# Patient Record
Sex: FEMALE | Race: BLACK OR AFRICAN AMERICAN | NOT HISPANIC OR LATINO | Employment: UNEMPLOYED | URBAN - METROPOLITAN AREA
[De-identification: names, ages, dates, MRNs, and addresses within clinical notes are randomized per-mention and may not be internally consistent; named-entity substitution may affect disease eponyms.]

---

## 2023-06-28 ENCOUNTER — OFFICE VISIT (OUTPATIENT)
Dept: PRIMARY CARE | Facility: CLINIC | Age: 58
End: 2023-06-28
Payer: COMMERCIAL

## 2023-06-28 DIAGNOSIS — R47.81 DEFICIT IN COMMUNICATION DUE TO SLURRED SPEECH: Primary | ICD-10-CM

## 2023-06-28 PROCEDURE — 99213 OFFICE O/P EST LOW 20 MIN: CPT | Performed by: INTERNAL MEDICINE

## 2023-06-28 ASSESSMENT — ENCOUNTER SYMPTOMS
COUGH: 0
SHORTNESS OF BREATH: 0
FEVER: 0
PALPITATIONS: 0
POLYDIPSIA: 0
CHILLS: 0

## 2023-06-28 NOTE — PROGRESS NOTES
Subjective   Patient ID: Mariluz Beck is a 58 y.o. female who presents for No chief complaint on file..    Patient presents today for chronic concerns of slurring of speech.  There is been no history of stroke seizure or neurologic disorder of any type.  This has been gradual onset.  The patient's not particularly verbose and there is been some weakening of the motor muscles she slurs a little bit with her speech it can make her hard to understand that she does not talk with great vocal clarity now.  They are requesting for an evaluation of speech therapy to improve upon her vocal clarity and speech clarity.  Otherwise she has no focal deficits no history of neurological deficits and no history of stroke trauma and no other complaints at this time.  No headaches no nothing.             Review of Systems   Constitutional:  Negative for chills and fever.   Respiratory:  Negative for cough and shortness of breath.    Cardiovascular:  Negative for chest pain and palpitations.   Endocrine: Negative for polydipsia and polyuria.       Objective   There were no vitals taken for this visit.    Physical Exam  HENT:      Head: Normocephalic and atraumatic.      Right Ear: Tympanic membrane and ear canal normal. There is no impacted cerumen.      Left Ear: Tympanic membrane and ear canal normal. There is no impacted cerumen.      Nose: Nose normal.      Mouth/Throat:      Mouth: Mucous membranes are moist.      Pharynx: Oropharynx is clear.   Eyes:      Extraocular Movements: Extraocular movements intact.      Pupils: Pupils are equal, round, and reactive to light.   Cardiovascular:      Rate and Rhythm: Normal rate.   Pulmonary:      Effort: No respiratory distress.      Breath sounds: No wheezing, rhonchi or rales.   Musculoskeletal:      Cervical back: Neck supple. No tenderness.   Lymphadenopathy:      Cervical: No cervical adenopathy.   Neurological:      General: No focal deficit present.      Cranial Nerves: Cranial  nerves 2-12 are intact.         Assessment/Plan

## 2023-07-21 ENCOUNTER — TELEPHONE (OUTPATIENT)
Dept: PRIMARY CARE | Facility: CLINIC | Age: 58
End: 2023-07-21
Payer: COMMERCIAL

## 2023-09-11 ENCOUNTER — PATIENT OUTREACH (OUTPATIENT)
Dept: PRIMARY CARE | Facility: CLINIC | Age: 58
End: 2023-09-11
Payer: COMMERCIAL

## 2023-09-11 ENCOUNTER — TELEPHONE (OUTPATIENT)
Dept: PRIMARY CARE | Facility: CLINIC | Age: 58
End: 2023-09-11
Payer: COMMERCIAL

## 2023-09-11 RX ORDER — LEVETIRACETAM 500 MG/1
1000 TABLET ORAL 2 TIMES DAILY
COMMUNITY

## 2023-09-11 RX ORDER — LEVOTHYROXINE SODIUM 100 UG/1
100 TABLET ORAL
COMMUNITY

## 2023-09-11 NOTE — PROGRESS NOTES
Discharge Facility: [facility]   Discharge Diagnosis: Nausea AND vomiting  Admission Date: 9-8-2023  Discharge Date: 9-    PCP Appointment Date: No available TCM appointments.  will outreach the clinical pool /office for scheduling assistance    Specialist Appointment Date: Dr De La Rosa  [facility] Encounter and Summary: Linked   See discharge assessment below for further details    Engagement  Call Start Time: 1536 (9/11/2023  3:36 PM)    Medications  Medications reviewed with patient/caregiver?: Yes (patients mother) (9/11/2023  3:36 PM)  Is the patient having any side effects they believe may be caused by any medication additions or changes?: No (9/11/2023  3:36 PM)  Does the patient have all medications ordered at discharge?: Yes (9/11/2023  3:36 PM)  Prescription Comments: Denies any  questions or concerns about their medications once they are home. Verbalizes an understanding regarding how to take their medications and which medications they should be taking. (9/11/2023  3:36 PM)  Is the patient taking all medications as directed (includes completed medication regime)?: Yes (9/11/2023  3:36 PM)  Care Management Interventions: Provided patient education (9/11/2023  3:36 PM)  Medication Comments: gabapentin (NEURONTIN) 300 mg capsule  Take 900 mg by mouth twice daily.       aspirin, enteric coated (ASPIRIN, ENTERIC COATED) 81 mg EC tablet  Take 81 mg by mouth once daily                                                                                                          ferrous sulfate 325 mg (65 mg iron) tablet  Take 325 mg by mouth once daily.  SYNTHROID 100 mcg tablet  1 tab once daily LORIN SYNTHROID                cholecalciferol, vitamin D3, (VITAMIN D3 ORAL)  Take 1,000 Units by mouth once daily.  levETIRAcetam (KEPPRA) 500 mg tablet  Take 1,000 mg by mouth twice daily.     VIACTIV 500 MG-100 UNIT-40 MCG CHEWABLE TAB Take one(1) tablet two(2) times daily. (9/11/2023  3:36 PM)    Appointments  Does  the patient have a primary care provider?: Yes (2023  3:36 PM)  Care Management Interventions: -- (No available TCM appointments.  will outreach the clinical pool /office for scheduling assistance) (2023  3:36 PM)  Has the patient kept scheduled appointments due by today?: Yes (2023  3:36 PM)  Care Management Interventions: Advised to schedule with specialist (recommendations to follow up with Neurology) (2023  3:36 PM)    Self Management  What is the home health agency?: NA (2023  3:36 PM)  Has home health visited the patient within 72 hours of discharge?: Not applicable (2023  3:36 PM)  What Durable Medical Equipment (DME) was ordered?: NA (2023  3:36 PM)    Patient Teaching  Does the patient have access to their discharge instructions?: Yes (Reviewed with mom with patients permission) (2023  3:36 PM)  What is the patient's perception of their health status since discharge?: Improving (2023  3:36 PM)  Is the patient/caregiver able to teach back the hierarchy of who to call/visit for symptoms/problems? PCP, Specialist, Home Health nurse, Urgent Care, ED, 911: Yes (2023  3:36 PM)    Wrap Up  Wrap Up Additional Comments: Spoke w/ pt  Pt identified by name and  patient granted permission to speak with her mother. Denies any further nausea or vomiting. Feeling better.  Reviewed discharge instructions, medications, and follow up.  Patient denies any further discharge questions/needs at this time.  Mom has a call into Dr. Huizar to discuss recent hospitalization. Confirms they will attend the scheduled follow up appointment  if recommended to schedule. Encouraged to fu with Neurology per the discharge instructions. (2023  3:36 PM)  Call End Time: 1556 (2023  3:36 PM)

## 2023-09-19 ENCOUNTER — OFFICE VISIT (OUTPATIENT)
Dept: PRIMARY CARE | Facility: CLINIC | Age: 58
End: 2023-09-19
Payer: COMMERCIAL

## 2023-09-19 VITALS
BODY MASS INDEX: 30.94 KG/M2 | DIASTOLIC BLOOD PRESSURE: 60 MMHG | TEMPERATURE: 97.4 F | WEIGHT: 110 LBS | HEART RATE: 57 BPM | SYSTOLIC BLOOD PRESSURE: 114 MMHG

## 2023-09-19 DIAGNOSIS — R11.10 RECURRENT VOMITING: Primary | ICD-10-CM

## 2023-09-19 DIAGNOSIS — R11.10 REGURGITATION OF FOOD: ICD-10-CM

## 2023-09-19 DIAGNOSIS — R13.12 OROPHARYNGEAL DYSPHAGIA: ICD-10-CM

## 2023-09-19 PROBLEM — G40.309: Status: ACTIVE | Noted: 2023-09-19

## 2023-09-19 PROBLEM — G40.909 SEIZURE DISORDER (MULTI): Status: ACTIVE | Noted: 2023-09-19

## 2023-09-19 PROBLEM — M85.80 OSTEOPENIA: Status: ACTIVE | Noted: 2023-09-19

## 2023-09-19 PROBLEM — R62.50 DEVELOPMENTAL DELAY: Status: RESOLVED | Noted: 2018-07-12 | Resolved: 2023-09-19

## 2023-09-19 PROCEDURE — 1036F TOBACCO NON-USER: CPT | Performed by: INTERNAL MEDICINE

## 2023-09-19 PROCEDURE — 99214 OFFICE O/P EST MOD 30 MIN: CPT | Performed by: INTERNAL MEDICINE

## 2023-09-19 RX ORDER — CARBAMAZEPINE 100 MG/1
TABLET, CHEWABLE ORAL
COMMUNITY
Start: 2014-01-30

## 2023-09-19 RX ORDER — PANTOPRAZOLE SODIUM 40 MG/1
40 TABLET, DELAYED RELEASE ORAL
Qty: 45 TABLET | Refills: 0 | Status: SHIPPED | OUTPATIENT
Start: 2023-09-19 | End: 2023-09-19 | Stop reason: SDUPTHER

## 2023-09-19 RX ORDER — PANTOPRAZOLE SODIUM 40 MG/1
40 TABLET, DELAYED RELEASE ORAL
Qty: 45 TABLET | Refills: 0 | Status: SHIPPED | OUTPATIENT
Start: 2023-09-19 | End: 2024-05-07 | Stop reason: ALTCHOICE

## 2023-09-19 ASSESSMENT — ENCOUNTER SYMPTOMS
SHORTNESS OF BREATH: 0
CHILLS: 0
COUGH: 0
POLYDIPSIA: 0
FEVER: 0
PALPITATIONS: 0

## 2023-09-19 NOTE — PROGRESS NOTES
Subjective   Patient ID: Mariluz Beck is a 58 y.o. female who presents for Hospital Follow-up.    Engagement  Call Start Time: 1536 (9/11/2023  3:36 PM)    Medications  Medications reviewed with patient/caregiver?: Yes (patients mother) (9/11/2023  3:36 PM)  Is the patient having any side effects they believe may be caused by any medication additions or changes?: No (9/11/2023  3:36 PM)  Does the patient have all medications ordered at discharge?: Yes (9/11/2023  3:36 PM)  Prescription Comments: Denies any  questions or concerns about their medications once they are home. Verbalizes an understanding regarding how to take their medications and which medications they should be taking. (9/11/2023  3:36 PM)  Is the patient taking all medications as directed (includes completed medication regime)?: Yes (9/11/2023  3:36 PM)  Care Management Interventions: Provided patient education (9/11/2023  3:36 PM)  Medication Comments: gabapentin (NEURONTIN) 300 mg capsule  Take 900 mg by mouth twice daily.       aspirin, enteric coated (ASPIRIN, ENTERIC COATED) 81 mg EC tablet  Take 81 mg by mouth once daily                                                                                                          ferrous sulfate 325 mg (65 mg iron) tablet  Take 325 mg by mouth once daily.  SYNTHROID 100 mcg tablet  1 tab once daily LORIN SYNTHROID                cholecalciferol, vitamin D3, (VITAMIN D3 ORAL)  Take 1,000 Units by mouth once daily.  levETIRAcetam (KEPPRA) 500 mg tablet  Take 1,000 mg by mouth twice daily.     VIACTIV 500 MG-100 UNIT-40 MCG CHEWABLE TAB Take one(1) tablet two(2) times daily. (9/11/2023  3:36 PM)    Appointments  Does the patient have a primary care provider?: Yes (9/11/2023  3:36 PM)  Care Management Interventions: -- (No available TCM appointments.  will outreach the clinical pool /office for scheduling assistance) (9/11/2023  3:36 PM)  Has the patient kept scheduled appointments due by today?: Yes  (2023  3:36 PM)  Care Management Interventions: Advised to schedule with specialist (recommendations to follow up with Neurology) (2023  3:36 PM)    Self Management  What is the home health agency?: NA (2023  3:36 PM)  Has home health visited the patient within 72 hours of discharge?: Not applicable (2023  3:36 PM)  What Durable Medical Equipment (DME) was ordered?: NA (2023  3:36 PM)    Patient Teaching  Does the patient have access to their discharge instructions?: Yes (Reviewed with mom with patients permission) (2023  3:36 PM)  What is the patient's perception of their health status since discharge?: Improving (2023  3:36 PM)  Is the patient/caregiver able to teach back the hierarchy of who to call/visit for symptoms/problems? PCP, Specialist, Home Health nurse, Urgent Care, ED, 911: Yes (2023  3:36 PM)    Wrap Up  Wrap Up Additional Comments: Spoke w/ pt  Pt identified by name and  patient granted permission to speak with her mother. Denies any further nausea or vomiting. Feeling better.  Reviewed discharge instructions, medications, and follow up.  Patient denies any further discharge questions/needs at this time.  Mom has a call into Dr. Huizar to discuss recent hospitalization. Confirms they will attend the scheduled follow up appointment  if recommended to schedule. Encouraged to fu with Neurology per the discharge instructions. (2023  3:36 PM)  Call End Time: 1556 (2023  3:36 PM)    58-year-old female presents today posthospital discharge on September 10 after spontaneous recurring bouts of vomiting without warning after meals or eating something.  She was sent there by her neurologist who manages her for epilepsy.  She reports no episodes of abdominal pain related to this.  She reports no episodes of nausea preceding the vomiting.  There was spontaneous unpredictable and with very little warning for history.  There have been no episodes within the  last 4 days.  She was not started on any new medications and she denies any new symptoms at this time that is developed post hospital.  At this time today she complains of no symptoms and has been tolerant of p.o.         Review of Systems   Constitutional:  Negative for chills and fever.   Respiratory:  Negative for cough and shortness of breath.    Cardiovascular:  Negative for chest pain and palpitations.   Endocrine: Negative for polydipsia and polyuria.       Objective   /60 (BP Location: Right arm, Patient Position: Sitting, BP Cuff Size: Adult)   Pulse 57   Temp 36.3 °C (97.4 °F) (Temporal)   Wt 49.9 kg (110 lb)   BMI 30.94 kg/m²     Physical Exam  Constitutional:       Appearance: Normal appearance.   HENT:      Head: Normocephalic and atraumatic.   Eyes:      Extraocular Movements: Extraocular movements intact.      Pupils: Pupils are equal, round, and reactive to light.   Neck:      Thyroid: No thyroid mass or thyromegaly.      Vascular: No carotid bruit.   Cardiovascular:      Rate and Rhythm: Normal rate and regular rhythm.      Heart sounds: No murmur heard.     No friction rub. No gallop.   Pulmonary:      Effort: No respiratory distress.      Breath sounds: No wheezing, rhonchi or rales.   Musculoskeletal:      Cervical back: Neck supple.      Right lower leg: No edema.      Left lower leg: No edema.   Lymphadenopathy:      Cervical: No cervical adenopathy.   Neurological:      Mental Status: She is alert.         Assessment/Plan   Problem List Items Addressed This Visit    None  Visit Diagnoses       Recurrent vomiting    -  Primary    Relevant Medications    pantoprazole (ProtoNix) 40 mg EC tablet    Other Relevant Orders    FL esophagus barium swallow w video and speech    Regurgitation of food        Relevant Medications    pantoprazole (ProtoNix) 40 mg EC tablet    Other Relevant Orders    FL esophagus barium swallow w video and speech    Oropharyngeal dysphagia        Relevant Orders     FL esophagus barium swallow w video and speech

## 2023-09-28 ENCOUNTER — APPOINTMENT (OUTPATIENT)
Dept: PRIMARY CARE | Facility: CLINIC | Age: 58
End: 2023-09-28
Payer: COMMERCIAL

## 2023-09-28 ENCOUNTER — PATIENT OUTREACH (OUTPATIENT)
Dept: PRIMARY CARE | Facility: CLINIC | Age: 58
End: 2023-09-28

## 2023-09-28 NOTE — PROGRESS NOTES
Call regarding appt. with PCP on  9- after hospitalization.  At time of outreach call the patient feels as if their condition has improved.  They  deny any questions or concerns regarding  the recovery period  or follow up appointment,  Denies any needs at this time.

## 2023-10-03 ENCOUNTER — CLINICAL SUPPORT (OUTPATIENT)
Dept: PRIMARY CARE | Facility: CLINIC | Age: 58
End: 2023-10-03
Payer: MEDICARE

## 2023-10-03 DIAGNOSIS — Z23 ENCOUNTER FOR IMMUNIZATION: ICD-10-CM

## 2023-10-03 PROCEDURE — G0008 ADMIN INFLUENZA VIRUS VAC: HCPCS | Performed by: INTERNAL MEDICINE

## 2023-10-03 PROCEDURE — 90682 RIV4 VACC RECOMBINANT DNA IM: CPT | Performed by: INTERNAL MEDICINE

## 2023-10-11 ENCOUNTER — HOSPITAL ENCOUNTER (OUTPATIENT)
Dept: RADIOLOGY | Facility: HOSPITAL | Age: 58
Discharge: HOME | End: 2023-10-11
Payer: COMMERCIAL

## 2023-10-11 DIAGNOSIS — R13.12 OROPHARYNGEAL DYSPHAGIA: ICD-10-CM

## 2023-10-11 DIAGNOSIS — R11.10 RECURRENT VOMITING: ICD-10-CM

## 2023-10-11 DIAGNOSIS — R11.10 REGURGITATION OF FOOD: ICD-10-CM

## 2023-10-11 PROBLEM — G47.10 EXCESSIVE SLEEPINESS: Status: ACTIVE | Noted: 2023-10-11

## 2023-10-11 PROBLEM — M48.10 DISH (DIFFUSE IDIOPATHIC SKELETAL HYPEROSTOSIS): Status: ACTIVE | Noted: 2023-10-11

## 2023-10-11 PROBLEM — M25.561 CHRONIC PAIN OF RIGHT KNEE: Status: ACTIVE | Noted: 2023-10-11

## 2023-10-11 PROBLEM — G89.29 CHRONIC PAIN OF RIGHT KNEE: Status: ACTIVE | Noted: 2023-10-11

## 2023-10-11 PROCEDURE — 92611 MOTION FLUOROSCOPY/SWALLOW: CPT | Mod: GN

## 2023-10-11 PROCEDURE — 92526 ORAL FUNCTION THERAPY: CPT | Mod: GN

## 2023-10-11 PROCEDURE — 74230 X-RAY XM SWLNG FUNCJ C+: CPT

## 2023-10-11 PROCEDURE — 74230 X-RAY XM SWLNG FUNCJ C+: CPT | Performed by: RADIOLOGY

## 2023-10-11 RX ORDER — GABAPENTIN 300 MG/1
CAPSULE ORAL
COMMUNITY
Start: 2023-09-27

## 2023-10-11 RX ORDER — PREDNISONE 20 MG/1
20 TABLET ORAL DAILY
COMMUNITY
End: 2024-05-07 | Stop reason: ALTCHOICE

## 2023-10-11 RX ORDER — LEVETIRACETAM 750 MG/1
TABLET ORAL
COMMUNITY
Start: 2023-09-27 | End: 2024-05-07 | Stop reason: ALTCHOICE

## 2023-10-11 RX ORDER — ACETAMINOPHEN 500 MG
500 TABLET ORAL 2 TIMES DAILY
COMMUNITY

## 2023-10-12 NOTE — RESULT ENCOUNTER NOTE
Full swallow evaluation shows minimal aspiration of thin liquids like water without penetration through the vocal cords into the lungs.  All other levels evaluated of liquids and solids were perfectly normal.  This is a essentially normal or close to normal examination.  Cautious swallowing with some liquids like water would be ideal slow focused chewing and swallowing ideal.  But no direct interventions are necessary based on this test.  I see no evidence for high risk aspiration events and future

## 2023-10-12 NOTE — PROGRESS NOTES
SLP Outpatient MBSS Evaluation    Patient Name: Mariluz Beck  MRN: [id number]  Today's Date: 10/12/2023   Time Calculation  Start Time: 1420  Stop Time: 1500  Time Calculation (min): 40 min         Current Problem:   Patient Active Problem List   Diagnosis    Seizure disorder (CMS/HCC)    Osteopenia    Non-refractory idiopathic generalized epilepsy (CMS/HCC)    Hypothyroidism    Chronic pain of right knee    DISH (diffuse idiopathic skeletal hyperostosis)    Excessive sleepiness         Recommendations/Treatment:  Recommendations: GI Evaluation, Neurology (OP SLP services for dysphagia intervention as well as S/L to improve speech intelligbility via compensatory strategies)  Solid Consistency: Regular (IDDSI Level 7)  Liquid Consistency: Thin (IDDSI Level 0)  Compensatory Strategy Recommendations: Add moisture to solids, Double/multiple swallows, Single sips, Small sips, Alternating liquids and solids      Assessment/Impression:   MBSS completed. Informed verbal consent obtained prior to completion of exam. Trials of thin and nectar thick liquids were given in addition to pureed textures and regular solids. Pt presented w/ prolonged mastication w/ solids. Timely swallow onset. During the swallow, pt demonstrated decreased BOT retraction, epiglottic deflection, hyolaryngeal elevation/excursion and UES opening. Mod amounts of pharyngeal residue remained (valleculae>) - Increased residue w/ increased viscosity. Cued liquid wash and double swallow were both effective strategies. X1 instance of laryngeal penetration s/p completion of swallow w/ thin liquids (residue visualized on epiglottic rim). Pt completed 2ndary swallow that ejected penetrated material.   SLP completed A-P view w/ esophageal sweep during consumption of nectar thick liquids. Noted? Rentention of constrast w/in esophagus. Recommend pt f/u w/ GI. Defer to Radiologist's report for more details.   At end of session, SLP reviewed results/recommendations and  safe swallow guidelines for oral intake w/ pt and pt's mom. Per pt's mom, pt has had worsening speech and had been seen by a neurologist ~3weeks prior. Given ID'd dysphagia as well as worsening speech, would recommend pt f/u w/ neurologist. Additionally, would recommend pt f/u w/ OP SLP for dysphagia intervention (review of safe swallow guidelines/pharyngeal strengthening exercises pending etiology of dysphagia) as well as compensatory strategies for improving speech intelligibility.      Prognosis:  Good      Plan:  Solid Consistency: Regular (IDDSI Level 7)  Liquid Consistency: Thin (IDDSI Level 0)      Treatment Provided:   Yes - SLP reviewed MBSS images, results/recommendations and safe swallow guidelines. Written instructions provided as a visual cue/reminder. Pt verbalized understanding and able to recall strategies with 100% acc. SLP also educ pt/family re: referrals to aid in identifying etiology of deficits. Pt's mom verbalized understanding and in agreement.       Subjective   Current Problem:  Pt referred for OP MBSS 2/2 vomiting that has since improved as a result of beginning oral medications to aid in reducing stomach acidity (per pt's mom).       General Visit Information:  Patient Class: Outpatient  Arrival: Accompanied by: __, Family/caregiver present (Pt's mom)  Reason for Referral: C/f dysphagia, pt w/ vomiting s/p PO intake (currently on oral medications to reduce vomiting and has not vomited in 2 weeks)  Date of Onset: 10/11/23  Type of Study: Initial MBS  Base[place] Diet: Regular diet w/ Thin liquids  Dysphagia Diagnosis:  (Mild Oropharyngeal Dysphagia)        Oral Phase:  Oral Phase: Impaired              Pharyngeal Phase:  Pharyngeal Phase: Impaired            [place]:  Consistencies/Score: Thin: 3 - Material enters airway, remains above cords, not ejected from airway  Consistencies/Score: Nectar Liquid: 1 - Material does not enter airway  Consistencies/Score: Pudding/Puree: 1 - Material does  not enter airway  Consistencies/Score: Solid: 1 - Material does not enter airway      Esophageal Phase:  Esophageal Phase: Impaired                   Outpatient Education:  SLP educ pt re: above results/recommendations and POC.          Consultations/Referrals/Coordination of Services: GI, Neuro, OP SLP

## 2023-10-13 ENCOUNTER — PATIENT OUTREACH (OUTPATIENT)
Dept: PRIMARY CARE | Facility: CLINIC | Age: 58
End: 2023-10-13
Payer: COMMERCIAL

## 2023-10-13 NOTE — PROGRESS NOTES
Call placed regarding one month post discharge follow up call.  At time of outreach call the patient feels as if their condition has improved.  They  deny any questions or concerns regarding  the recovery period  or follow up appointment,  Reports that she completed the modified barium swallow study on 10-. She is waiting to hear follow up from Dr. Huizar.  They are  agreeable to continued outreach by this TCM provider.  They have my direct phone # and were encouraged to please reach out for any non emergent concerns prior to my next outreach.

## 2023-10-16 ENCOUNTER — TELEPHONE (OUTPATIENT)
Dept: PRIMARY CARE | Facility: CLINIC | Age: 58
End: 2023-10-16
Payer: COMMERCIAL

## 2023-10-16 NOTE — TELEPHONE ENCOUNTER
----- Message from Kirit Huizar MD sent at 10/12/2023 12:25 PM EDT -----  Full swallow evaluation shows minimal aspiration of thin liquids like water without penetration through the vocal cords into the lungs.  All other levels evaluated of liquids and solids were perfectly normal.  This is a essentially normal or close to   normal examination.  Cautious swallowing with some liquids like water would be ideal slow focused chewing and swallowing ideal.  But no direct interventions are necessary based on this test.  I see no evidence for high risk aspiration events and fut  ure

## 2023-10-23 ENCOUNTER — ANCILLARY PROCEDURE (OUTPATIENT)
Dept: RADIOLOGY | Facility: CLINIC | Age: 58
End: 2023-10-23
Payer: MEDICARE

## 2023-10-23 ENCOUNTER — OFFICE VISIT (OUTPATIENT)
Dept: ORTHOPEDIC SURGERY | Facility: CLINIC | Age: 58
End: 2023-10-23
Payer: MEDICARE

## 2023-10-23 VITALS — HEIGHT: 55 IN | WEIGHT: 106.6 LBS | BODY MASS INDEX: 24.67 KG/M2

## 2023-10-23 DIAGNOSIS — M17.11 PRIMARY OSTEOARTHRITIS OF RIGHT KNEE: ICD-10-CM

## 2023-10-23 DIAGNOSIS — M25.561 RIGHT KNEE PAIN, UNSPECIFIED CHRONICITY: ICD-10-CM

## 2023-10-23 DIAGNOSIS — M25.561 RIGHT KNEE PAIN, UNSPECIFIED CHRONICITY: Primary | ICD-10-CM

## 2023-10-23 PROCEDURE — 73562 X-RAY EXAM OF KNEE 3: CPT | Mod: RT

## 2023-10-23 PROCEDURE — 1036F TOBACCO NON-USER: CPT | Performed by: ORTHOPAEDIC SURGERY

## 2023-10-23 PROCEDURE — 73562 X-RAY EXAM OF KNEE 3: CPT | Mod: RIGHT SIDE | Performed by: RADIOLOGY

## 2023-10-23 PROCEDURE — 99214 OFFICE O/P EST MOD 30 MIN: CPT | Performed by: ORTHOPAEDIC SURGERY

## 2023-10-23 RX ORDER — METHYLPREDNISOLONE ACETATE 40 MG/ML
40 INJECTION, SUSPENSION INTRA-ARTICULAR; INTRALESIONAL; INTRAMUSCULAR; SOFT TISSUE ONCE
Status: SHIPPED | OUTPATIENT
Start: 2023-10-23

## 2023-10-23 ASSESSMENT — PAIN - FUNCTIONAL ASSESSMENT: PAIN_FUNCTIONAL_ASSESSMENT: 0-10

## 2023-10-23 ASSESSMENT — PAIN SCALES - GENERAL: PAINLEVEL_OUTOF10: 0 - NO PAIN

## 2023-10-23 ASSESSMENT — PAIN DESCRIPTION - DESCRIPTORS: DESCRIPTORS: ACHING

## 2023-10-23 NOTE — PROGRESS NOTES
Chief complaint is chronic right knee pain  She comes with an attendant as she has difficulty giving history  Does not sound like she is any treatment for this seems like it is mostly painful with activity  There is no history of injury  Past medical,family and social histories have been reviewed and are up to date.  All other body systems have been reviewed and are negative for complaint.  All other body systems have been reviewed and are negative for complaint.  Constitutional: Well-developed well-nourished   Eyes: Sclerae anicteric, pupils equal and round  HENT: Normocephalic atraumatic  Cardiovascular: Pulses full, regular rate and rhythm  Respiratory: Breathing not labored, no wheezing  Integumentary: Skin intact, no lesions or rashes  Neurological: Sensation intact, no gross strength deficits, reflexes equal  Psychiatric: Alert oriented and appropriate  Hematologic/lymphatic: No lymphadenopathy  Right knee: Mild varus deformity no effusion tender medial joint line, full range of motion, no instability  X-rays personally reviewed tricompartment arthritis right knee assessment knee arthritis  Injected knee today 40 mg Depo-Medrol 2 cc 2% lidocaine plain sterile technique informed consent discussed progression of arthritis and indications for new placement surgery  All questions answered  Follow-up as needed

## 2023-12-08 ENCOUNTER — PATIENT OUTREACH (OUTPATIENT)
Dept: PRIMARY CARE | Facility: CLINIC | Age: 58
End: 2023-12-08
Payer: COMMERCIAL

## 2023-12-08 NOTE — PROGRESS NOTES
Unsuccessful outreach to this  patient for the 90 day post discharge outreach. Left a voicemail message including my direct call back number for the patient to call regarding any questions or concerns.   Patient has met target of no readmissions for 30 or 90 days and has graduated from [facility]

## 2024-01-17 ENCOUNTER — TELEPHONE (OUTPATIENT)
Dept: PRIMARY CARE | Facility: CLINIC | Age: 59
End: 2024-01-17
Payer: MEDICARE

## 2024-05-07 ENCOUNTER — HOSPITAL ENCOUNTER (OUTPATIENT)
Dept: RADIOLOGY | Facility: CLINIC | Age: 59
Discharge: HOME | End: 2024-05-07
Payer: MEDICARE

## 2024-05-07 ENCOUNTER — OFFICE VISIT (OUTPATIENT)
Dept: PRIMARY CARE | Facility: CLINIC | Age: 59
End: 2024-05-07
Payer: MEDICARE

## 2024-05-07 VITALS
DIASTOLIC BLOOD PRESSURE: 73 MMHG | WEIGHT: 105 LBS | SYSTOLIC BLOOD PRESSURE: 139 MMHG | HEART RATE: 68 BPM | TEMPERATURE: 97.8 F | BODY MASS INDEX: 28.38 KG/M2

## 2024-05-07 DIAGNOSIS — R11.10 REGURGITATION OF FOOD: ICD-10-CM

## 2024-05-07 DIAGNOSIS — M41.25 OTHER IDIOPATHIC SCOLIOSIS, THORACOLUMBAR REGION: Primary | ICD-10-CM

## 2024-05-07 DIAGNOSIS — R11.10 RECURRENT VOMITING: ICD-10-CM

## 2024-05-07 DIAGNOSIS — M41.25 OTHER IDIOPATHIC SCOLIOSIS, THORACOLUMBAR REGION: ICD-10-CM

## 2024-05-07 PROCEDURE — 72100 X-RAY EXAM L-S SPINE 2/3 VWS: CPT

## 2024-05-07 PROCEDURE — G2211 COMPLEX E/M VISIT ADD ON: HCPCS | Performed by: INTERNAL MEDICINE

## 2024-05-07 PROCEDURE — 72100 X-RAY EXAM L-S SPINE 2/3 VWS: CPT | Performed by: RADIOLOGY

## 2024-05-07 PROCEDURE — 99214 OFFICE O/P EST MOD 30 MIN: CPT | Performed by: INTERNAL MEDICINE

## 2024-05-07 PROCEDURE — 72072 X-RAY EXAM THORAC SPINE 3VWS: CPT

## 2024-05-07 PROCEDURE — 1036F TOBACCO NON-USER: CPT | Performed by: INTERNAL MEDICINE

## 2024-05-07 PROCEDURE — 72072 X-RAY EXAM THORAC SPINE 3VWS: CPT | Performed by: RADIOLOGY

## 2024-05-07 ASSESSMENT — ENCOUNTER SYMPTOMS
POLYDIPSIA: 0
PALPITATIONS: 0
FEVER: 0
COUGH: 0
CHILLS: 0
SHORTNESS OF BREATH: 0

## 2024-05-07 NOTE — PROGRESS NOTES
Subjective   Patient ID: Mariluz Beck is a 59 y.o. female who presents for Follow-up.    59-year-old female presents today for routine follow-up.  Her stomach symptoms completely resolved with the treatment plan offered with the pantoprazole.  She completed the full 6-week course without complication and has been no further stomach irritation regurgitation or other concerns presented.  Medications had made multiple adjustments in the recent past as her neurologist is adjusting her seizure medications.  They report no seizure-like activity.    Their main concern for today's appointment is consideration of back support given her abnormal curvature of the spine and reduced range of motion.  She has a mildly exaggerated kyphosis of the thoracic spine and history of bone spurs.  No falls injuries or neuropathy noted at this time.  Range of motion is restricted to back extension.  I was happy to provide them with a prescription for a back support device although I did advise for some physical therapy for back muscle strengthening in addition to her treatment plan.         Review of Systems   Constitutional:  Negative for chills and fever.   Respiratory:  Negative for cough and shortness of breath.    Cardiovascular:  Negative for chest pain and palpitations.   Endocrine: Negative for polydipsia and polyuria.       Objective   /73 (BP Location: Right arm, Patient Position: Sitting, BP Cuff Size: Adult)   Pulse 68   Temp 36.6 °C (97.8 °F) (Temporal)   Wt 47.6 kg (105 lb)   BMI 28.38 kg/m²     Physical Exam  Constitutional:       Appearance: Normal appearance.   HENT:      Head: Normocephalic and atraumatic.   Eyes:      Extraocular Movements: Extraocular movements intact.      Pupils: Pupils are equal, round, and reactive to light.   Neck:      Thyroid: No thyroid mass or thyromegaly.      Vascular: No carotid bruit.   Cardiovascular:      Rate and Rhythm: Normal rate and regular rhythm.   Musculoskeletal:       Cervical back: Normal and neck supple.      Thoracic back: No swelling, deformity, signs of trauma or spasms. No scoliosis.      Right lower leg: No edema.      Left lower leg: No edema.      Comments: Slightly pronounced kyphosis of thoracic spine no obvious scoliosis on exam.    Normal-appearing lordosis on examination of the lumbar spine.    Cervical spine has the head tilted forward patient is capable of full range of motion of the neck   Lymphadenopathy:      Cervical: No cervical adenopathy.   Neurological:      Mental Status: She is alert.         Assessment/Plan   Problem List Items Addressed This Visit    None  Visit Diagnoses         Codes    Other idiopathic scoliosis, thoracolumbar region    -  Primary M41.25    Relevant Orders    XR lumbar spine 2-3 views    XR thoracic spine 2 views    General supply request Lumbar spine brace    Referral to Physical Therapy    Recurrent vomiting     R11.10    Regurgitation of food     R11.10

## 2024-05-13 ENCOUNTER — TELEPHONE (OUTPATIENT)
Dept: PRIMARY CARE | Facility: CLINIC | Age: 59
End: 2024-05-13
Payer: MEDICARE

## 2024-05-13 NOTE — TELEPHONE ENCOUNTER
----- Message from Kirit Huizar MD sent at 5/13/2024 11:51 AM EDT -----  Patient's x-rays do confirm the presence of some curvature in the spine called scoliosis through the mid and low back.  There is also elements of mild arthritis noted.  There is no signs of compression fractures or vertebral disc issues.  The scoliosis is rated as mild at this time and certainly would not warrant talking with the specialist at this particular juncture.

## 2024-06-11 ENCOUNTER — EVALUATION (OUTPATIENT)
Dept: PHYSICAL THERAPY | Facility: CLINIC | Age: 59
End: 2024-06-11
Payer: MEDICARE

## 2024-06-11 DIAGNOSIS — M41.25 OTHER IDIOPATHIC SCOLIOSIS, THORACOLUMBAR REGION: ICD-10-CM

## 2024-06-11 PROCEDURE — 97110 THERAPEUTIC EXERCISES: CPT | Mod: GP | Performed by: PHYSICAL THERAPIST

## 2024-06-11 PROCEDURE — 97161 PT EVAL LOW COMPLEX 20 MIN: CPT | Mod: GP | Performed by: PHYSICAL THERAPIST

## 2024-06-11 ASSESSMENT — ENCOUNTER SYMPTOMS
DEPRESSION: 0
LOSS OF SENSATION IN FEET: 1
OCCASIONAL FEELINGS OF UNSTEADINESS: 0

## 2024-06-11 NOTE — PROGRESS NOTES
Physical Therapy    Physical Therapy Evaluation and Treatment      Patient Name: Mariluz Beck  MRN: [id number]  Today's Date: 6/11/2024  Visit: 1  Insurance: Reviewed  Physician: Kirit Huizar  PT Evaluation Time Entry  PT Evaluation (Low) Time Entry: 25  PT Therapeutic Procedures Time Entry  Therapeutic Exercise Time Entry: 10  PT Modalities Time Entry  Hot/Cold Pack Time Entry: 10  Time Calculation  Start Time: 0150  Stop Time: 0235  Time Calculation (min): 45 min    Assessment:  Patient seen in PT for Initial Evaluation for back pain secondary to scoliosis.   Patient presents with postural deviation  decreased lumbar ROM , decreased core and hip strength, back tenderness.  Functionally, patient  unable to do strenuous ADL's.  Patient rates oswestry at 12%.    PT Assessment  Rehab Prognosis: Good     Plan:  Continue with POC  General fitness, posture/body mechanics, back stretches, core strength, HP    OP PT Plan  PT Plan: Skilled PT  PT Frequency: 1 time per week  Duration: 6  Onset Date: 05/07/24  Rehab Potential: Good  Plan of Care Agreement: Patient    Current Problem:   1. Other idiopathic scoliosis, thoracolumbar region  Referral to Physical Therapy    Follow Up In Physical Therapy          Subjective    General:  Patient with a history of back pain for 1 year.  Onset was insidious - but relates several falls.  Patient treated with volteren cream.  Patient complains of pain in the region of the T7-T8 spinous process, ache pain, 5/10 at worst last 7 days.  Patient's pain is worse with standing,.  Functionally, patient is unable to do heavy ADL's.  Xrays show mild levoscoliosis with spondylosis.         Precautions: fall risk, hx of seizures, hx of brain ca  Precautions  STEADI Fall Risk Score (The score of 4 or more indicates an increased risk of falling): 6    Prior Level of Function: slowly decreasing functional level       Objective     Posture: right sided scoliosis - lower thoracic region  lumbar ROM to 40  flex, 10 ext, 15 right SB, and 10 left SB.  abdominal strength to poor and back extensor strength to poor.  LE ROM: WFL  LE strength: hip abd and add 4/5   Tender to palpation at the T6-T9 spinous processes    Outcome Measures:  Other Measures  Oswestry Disablity Index (ELPIDIO): 12%     Treatments:  Patient instructed in HEP including: HLR, KTC and prone prop 10-20X each.  Instructed in use of stool and pillow support for sitting due to short stature.  ( 10 minutes)   HP to the lumbar/thoracic region (10 minutes)    EDUCATION: HEP       Goals:  Active       PT Problem       PT Goal 1       Start:  06/11/24    Expected End:  09/09/24       Improve back pain to 3/10 or less         PT Goal 2       Start:  06/11/24    Expected End:  09/09/24       Improve LEFS by 6%         PT Goal 3       Start:  06/11/24    Expected End:  09/09/24       Maximize lumbar ROM          PT Goal 4       Start:  06/11/24    Expected End:  09/09/24       Hip abd and add strength to 5/5   Fair abdominal strength   Fair back extensor strength

## 2024-06-20 ENCOUNTER — APPOINTMENT (OUTPATIENT)
Dept: PHYSICAL THERAPY | Facility: CLINIC | Age: 59
End: 2024-06-20
Payer: MEDICARE

## 2024-06-24 ENCOUNTER — TREATMENT (OUTPATIENT)
Dept: PHYSICAL THERAPY | Facility: CLINIC | Age: 59
End: 2024-06-24
Payer: MEDICARE

## 2024-06-24 DIAGNOSIS — M41.25 OTHER IDIOPATHIC SCOLIOSIS, THORACOLUMBAR REGION: ICD-10-CM

## 2024-06-24 PROCEDURE — 97110 THERAPEUTIC EXERCISES: CPT | Mod: GP,CQ

## 2024-06-24 ASSESSMENT — PAIN - FUNCTIONAL ASSESSMENT: PAIN_FUNCTIONAL_ASSESSMENT: 0-10

## 2024-06-24 ASSESSMENT — PAIN SCALES - GENERAL: PAINLEVEL_OUTOF10: 0 - NO PAIN

## 2024-06-24 NOTE — PROGRESS NOTES
Physical Therapy    Physical Therapy Treatment    Patient Name: Mariluz Beck  MRN: [id number]  Today's Date: 6/24/2024  Time Calculation  Start Time: 0100  Stop Time: 0145  Time Calculation (min): 45 min     PT Therapeutic Procedures Time Entry  Therapeutic Exercise Time Entry: 45                 Visit 2    Assessment:  PT Assessment  Evaluation/Treatment Tolerance: Patient tolerated treatment well  Cueing through out session for initial exercise form and form maintenance as well as speed of repetitions. No reports of increase pain today, finishes session feeling less tightness through her back. Increased cueing with squatting/lifting form. Fatigue with core strengthening.     Plan:   Continue with POC  General fitness, posture/body mechanics, back stretches, core strength, HP  Current Problem  1. Other idiopathic scoliosis, thoracolumbar region  Follow Up In Physical Therapy      General        Subjective     Back is sore today, no current pains. HEP completed daily, no issues with current given exercises. No other major updates.     Pain  Pain Assessment  Pain Assessment: 0-10  0-10 (Numeric) Pain Score: 0 - No pain    Objective     Treatments:  Therapeutic Exercise  Therapeutic Exercise Performed: Yes  LTR x 20  KTC x 5 each side  Ta holds 5 x 10 sec  Hip adduction with TA holding x 20  Hip abduction with TA holding x 20 RTB  Theraball pick ups (mini squat) x 20 (max cueing)  Standing lateral hip kicks x 10 each side  Review of lifting mechanics   Sit to stand from chair x 10  Seated theraball flexion x 10  Prone prop on elbows x 2 min   Prone press x 10     HP to the lumbar/thoracic region (10 minutes)    OP EDUCATION:     Goals:  Active       PT Problem       PT Goal 1       Start:  06/11/24    Expected End:  09/09/24       Improve back pain to 3/10 or less         PT Goal 2       Start:  06/11/24    Expected End:  09/09/24       Improve LEFS by 6%         PT Goal 3       Start:  06/11/24    Expected End:   09/09/24       Maximize lumbar ROM          PT Goal 4       Start:  06/11/24    Expected End:  09/09/24       Hip abd and add strength to 5/5   Fair abdominal strength   Fair back extensor strength

## 2024-07-02 ENCOUNTER — APPOINTMENT (OUTPATIENT)
Dept: PHYSICAL THERAPY | Facility: CLINIC | Age: 59
End: 2024-07-02
Payer: MEDICARE

## 2024-07-08 ENCOUNTER — DOCUMENTATION (OUTPATIENT)
Dept: PHYSICAL THERAPY | Facility: CLINIC | Age: 59
End: 2024-07-08
Payer: MEDICARE

## 2024-07-08 ENCOUNTER — APPOINTMENT (OUTPATIENT)
Dept: PHYSICAL THERAPY | Facility: CLINIC | Age: 59
End: 2024-07-08
Payer: MEDICARE

## 2024-07-08 NOTE — PROGRESS NOTES
Physical Therapy                 Therapy Communication Note    Patient Name: Mariluz Beck  MRN: [id number]  Today's Date: 7/8/2024     Discipline: Physical Therapy    Missed Visit Reason:  canceled via , no reason listed.     Missed Time: Cancel    Comment:

## 2024-07-19 ENCOUNTER — DOCUMENTATION (OUTPATIENT)
Dept: PHYSICAL THERAPY | Facility: CLINIC | Age: 59
End: 2024-07-19
Payer: MEDICARE

## 2024-07-23 NOTE — PROGRESS NOTES
Physical Therapy                 Therapy Communication Note    Patient Name: Mariluz Beck  MRN: [id number]  Today's Date: 7/23/2024     Discipline: Physical Therapy    Missed Visit Reason:  no showed, attempted to reach patient at home phone and cell, neither in service at this time.     Missed Time: No Show    Comment:

## 2024-10-15 ENCOUNTER — DOCUMENTATION (OUTPATIENT)
Dept: PHYSICAL THERAPY | Facility: CLINIC | Age: 59
End: 2024-10-15
Payer: MEDICARE

## 2024-10-15 NOTE — PROGRESS NOTES
Physical Therapy    Discharge Summary    Name: Mariluz Beck  MRN: [id number]  : 1965  Date: 10/15/24    Discharge Summary: PT    Discharge Information: Date of discharge 10/15/24, Date of last visit 24, Date of evaluation 24, Number of attended visits 2, Referred by Kirit Huizar, and Referred for idiopathic scoliosis    Therapy Summary: Patient seen in PT for 2 visits for idiopathic scoliosis.  Patient canc and dns for her last 2 follow up appointments and did not reschedule.      Discharge Status: Status unknown.      Rehab Discharge Reason: Failed to schedule and/or keep follow-up appointment(s)